# Patient Record
Sex: MALE | Race: BLACK OR AFRICAN AMERICAN | Employment: PART TIME | URBAN - METROPOLITAN AREA
[De-identification: names, ages, dates, MRNs, and addresses within clinical notes are randomized per-mention and may not be internally consistent; named-entity substitution may affect disease eponyms.]

---

## 2017-05-26 ENCOUNTER — APPOINTMENT (OUTPATIENT)
Dept: PHYSICAL THERAPY | Age: 22
End: 2017-05-26

## 2017-12-20 ENCOUNTER — HOSPITAL ENCOUNTER (EMERGENCY)
Age: 22
Discharge: HOME | End: 2017-12-21
Attending: EMERGENCY MEDICINE | Admitting: EMERGENCY MEDICINE
Payer: MEDICARE

## 2017-12-20 VITALS
SYSTOLIC BLOOD PRESSURE: 142 MMHG | DIASTOLIC BLOOD PRESSURE: 79 MMHG | BODY MASS INDEX: 29.02 KG/M2 | HEIGHT: 64 IN | WEIGHT: 170 LBS | HEART RATE: 80 BPM | OXYGEN SATURATION: 97 % | RESPIRATION RATE: 14 BRPM | TEMPERATURE: 97 F

## 2017-12-20 DIAGNOSIS — M54.2 CERVICALGIA: Primary | ICD-10-CM

## 2017-12-20 PROCEDURE — 99283 EMERGENCY DEPT VISIT LOW MDM: CPT

## 2017-12-20 RX ORDER — DIAZEPAM 5 MG/1
5 TABLET ORAL
Status: COMPLETED | OUTPATIENT
Start: 2017-12-20 | End: 2017-12-21

## 2017-12-20 RX ORDER — ACETAMINOPHEN 500 MG
1000 TABLET ORAL
Status: COMPLETED | OUTPATIENT
Start: 2017-12-20 | End: 2017-12-21

## 2017-12-21 ENCOUNTER — APPOINTMENT (OUTPATIENT)
Dept: GENERAL RADIOLOGY | Age: 22
End: 2017-12-21
Attending: PHYSICIAN ASSISTANT
Payer: MEDICARE

## 2017-12-21 PROCEDURE — 72040 X-RAY EXAM NECK SPINE 2-3 VW: CPT

## 2017-12-21 PROCEDURE — 74011250637 HC RX REV CODE- 250/637: Performed by: PHYSICIAN ASSISTANT

## 2017-12-21 RX ORDER — CYCLOBENZAPRINE HCL 10 MG
10 TABLET ORAL
Qty: 12 TAB | Refills: 0 | OUTPATIENT
Start: 2017-12-21 | End: 2020-10-10

## 2017-12-21 RX ADMIN — ACETAMINOPHEN 1000 MG: 500 TABLET ORAL at 00:06

## 2017-12-21 RX ADMIN — DIAZEPAM 5 MG: 5 TABLET ORAL at 00:06

## 2017-12-21 NOTE — ED TRIAGE NOTES
Pt states he has been having left-sided neck pain X 1 month, pt denies any trauma. Pt states he could be sleeping wrong causing his neck to hurt and the top of his left shoulder. Pt A & O X 3, follows commands, no distress noted at this time.
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
## 2017-12-21 NOTE — ED NOTES
I have reviewed discharge instructions with the patient. The patient verbalized understanding. Discharge medications reviewed with patient and appropriate educational materials and side effects teaching were provided. Pt is AxOx4, NAD. Pt ambulated out of ED with steady gait.

## 2020-02-08 ENCOUNTER — APPOINTMENT (OUTPATIENT)
Dept: GENERAL RADIOLOGY | Age: 25
End: 2020-02-08
Attending: PHYSICIAN ASSISTANT
Payer: MEDICARE

## 2020-02-08 ENCOUNTER — HOSPITAL ENCOUNTER (EMERGENCY)
Age: 25
Discharge: HOME | End: 2020-02-08
Attending: EMERGENCY MEDICINE
Payer: MEDICARE

## 2020-02-08 VITALS
OXYGEN SATURATION: 98 % | HEART RATE: 66 BPM | WEIGHT: 186 LBS | DIASTOLIC BLOOD PRESSURE: 81 MMHG | HEIGHT: 65 IN | BODY MASS INDEX: 30.99 KG/M2 | TEMPERATURE: 98.4 F | SYSTOLIC BLOOD PRESSURE: 138 MMHG | RESPIRATION RATE: 18 BRPM

## 2020-02-08 DIAGNOSIS — S21.112A STAB WOUND OF LEFT CHEST, INITIAL ENCOUNTER: Primary | ICD-10-CM

## 2020-02-08 PROCEDURE — 99281 EMR DPT VST MAYX REQ PHY/QHP: CPT

## 2020-02-08 PROCEDURE — 71046 X-RAY EXAM CHEST 2 VIEWS: CPT

## 2020-02-08 PROCEDURE — 75810000293 HC SIMP/SUPERF WND  RPR

## 2020-02-08 RX ORDER — ACETAMINOPHEN 325 MG/1
1000 TABLET ORAL
Qty: 120 TAB | Refills: 0 | OUTPATIENT
Start: 2020-02-08 | End: 2020-10-10
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
## 2020-02-09 NOTE — DISCHARGE INSTRUCTIONS
Patient Education        Cuts Closed With Adhesives: Care Instructions  Your Care Instructions  A cut can happen anywhere on your body. The doctor used an adhesive to close the cut. When the adhesive dries, it forms a film that holds the edges of the cut together. Skin adhesives are sometimes called liquid stitches. If the cut went deep and through the skin, the doctor may have put in a layer of stitches below the adhesive. The deeper layer of stitches brings the deep part of the cut together. These stitches will dissolve and don't need to be removed. You don't see the stitches, only the adhesive. You may have a bandage. The doctor has checked you carefully, but problems can develop later. If you notice any problems or new symptoms, get medical treatment right away. Follow-up care is a key part of your treatment and safety. Be sure to make and go to all appointments, and call your doctor if you are having problems. It's also a good idea to know your test results and keep a list of the medicines you take. How can you care for yourself at home? · Keep the cut dry for the first 24 to 48 hours. After this, you can shower if your doctor okays it. Pat the cut dry. · Don't soak the cut, such as in a bathtub. Your doctor will tell you when it's safe to get the cut wet. · If your doctor told you how to care for your cut, follow your doctor's instructions. If you did not get instructions, follow this general advice:  ? Do not put any kind of ointment, cream, or lotion over the area. This can make the adhesive fall off too soon. ? After the first 24 to 48 hours, wash around the cut with clean water 2 times a day. Do not use hydrogen peroxide or alcohol, which can slow healing. ? If the doctor told you to use a bandage, put on a new bandage after cleaning the cut or if the bandage gets wet or dirty. · Prop up the sore area on a pillow anytime you sit or lie down during the next 3 days.  Try to keep it above the level of your heart. This will help reduce swelling. · Leave the skin adhesive on your skin until it falls off on its own. This may take 5 to 10 days. · Do not scratch, rub, or pick at the adhesive. · Do not put the sticky part of a bandage directly on the adhesive. · Avoid any activity that could cause your cut to reopen. · Be safe with medicines. Read and follow all instructions on the label. ? If the doctor gave you a prescription medicine for pain, take it as prescribed. ? If you are not taking a prescription pain medicine, ask your doctor if you can take an over-the-counter medicine. When should you call for help? Call your doctor now or seek immediate medical care if:    · You have new pain, or your pain gets worse.     · The skin near the cut is cold or pale or changes color.     · You have tingling, weakness, or numbness near the cut.     · The cut starts to bleed.     · You have trouble moving the area near the cut.     · You have symptoms of infection, such as:  ? Increased pain, swelling, warmth, or redness around the cut.  ? Red streaks leading from the cut.  ? Pus draining from the cut.  ? A fever.    Watch closely for changes in your health, and be sure to contact your doctor if:    · The cut reopens.     · You do not get better as expected. Where can you learn more? Go to http://vinayak-thomas.info/. Enter P174 in the search box to learn more about \"Cuts Closed With Adhesives: Care Instructions. \"  Current as of: June 26, 2019  Content Version: 12.2  © 0041-7117 [employer]. Care instructions adapted under license by [employer] (which disclaims liability or warranty for this information). If you have questions about a medical condition or this instruction, always ask your healthcare professional. [address] any warranty or liability for your use of this information.

## 2020-10-10 ENCOUNTER — HOSPITAL ENCOUNTER (EMERGENCY)
Age: 25
Discharge: HOME | End: 2020-10-10
Attending: EMERGENCY MEDICINE
Payer: MEDICARE

## 2020-10-10 VITALS
WEIGHT: 193 LBS | TEMPERATURE: 98 F | OXYGEN SATURATION: 99 % | SYSTOLIC BLOOD PRESSURE: 124 MMHG | HEART RATE: 76 BPM | BODY MASS INDEX: 32.95 KG/M2 | HEIGHT: 64 IN | RESPIRATION RATE: 16 BRPM | DIASTOLIC BLOOD PRESSURE: 77 MMHG

## 2020-10-10 DIAGNOSIS — S16.1XXA STRAIN OF NECK MUSCLE, INITIAL ENCOUNTER: Primary | ICD-10-CM

## 2020-10-10 PROCEDURE — 99281 EMR DPT VST MAYX REQ PHY/QHP: CPT

## 2020-10-10 RX ORDER — ACETAMINOPHEN 325 MG/1
650 TABLET ORAL
Qty: 20 TAB | Refills: 0 | Status: SHIPPED | OUTPATIENT
Start: 2020-10-10

## 2020-10-10 RX ORDER — CYCLOBENZAPRINE HCL 10 MG
10 TABLET ORAL 3 TIMES DAILY
Qty: 9 TAB | Refills: 0 | Status: SHIPPED | OUTPATIENT
Start: 2020-10-10 | End: 2020-10-13
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
## 2024-03-17 ENCOUNTER — HOSPITAL ENCOUNTER (EMERGENCY)
Facility: HOSPITAL | Age: 29
Discharge: HOME | End: 2024-03-17
Attending: EMERGENCY MEDICINE
Payer: COMMERCIAL

## 2024-03-17 VITALS
HEIGHT: 62 IN | WEIGHT: 198 LBS | TEMPERATURE: 99 F | BODY MASS INDEX: 36.44 KG/M2 | SYSTOLIC BLOOD PRESSURE: 174 MMHG | DIASTOLIC BLOOD PRESSURE: 71 MMHG | RESPIRATION RATE: 20 BRPM | OXYGEN SATURATION: 97 % | HEART RATE: 99 BPM

## 2024-03-17 DIAGNOSIS — M54.42 ACUTE LEFT-SIDED LOW BACK PAIN WITH BILATERAL SCIATICA: Primary | ICD-10-CM

## 2024-03-17 DIAGNOSIS — K59.09 OTHER CONSTIPATION: ICD-10-CM

## 2024-03-17 DIAGNOSIS — G43.009 MIGRAINE WITHOUT AURA AND WITHOUT STATUS MIGRAINOSUS, NOT INTRACTABLE: ICD-10-CM

## 2024-03-17 DIAGNOSIS — M54.41 ACUTE LEFT-SIDED LOW BACK PAIN WITH BILATERAL SCIATICA: Primary | ICD-10-CM

## 2024-03-17 LAB
APPEARANCE UR: CLEAR
BILIRUB UR QL: NEGATIVE
COLOR UR: YELLOW
GLUCOSE UR STRIP.AUTO-MCNC: NEGATIVE MG/DL
HGB UR QL STRIP: NEGATIVE
KETONES UR QL STRIP.AUTO: NEGATIVE MG/DL
LEUKOCYTE ESTERASE UR QL STRIP.AUTO: NEGATIVE
NITRITE UR QL STRIP.AUTO: NEGATIVE
PH UR STRIP: 6 (ref 5–8)
PROT UR STRIP-MCNC: NEGATIVE MG/DL
SP GR UR REFRACTOMETRY: 1.01 (ref 1–1.03)
UROBILINOGEN UR QL STRIP.AUTO: 1 EU/DL (ref 0.2–1)

## 2024-03-17 PROCEDURE — 81003 URINALYSIS AUTO W/O SCOPE: CPT

## 2024-03-17 PROCEDURE — 96372 THER/PROPH/DIAG INJ SC/IM: CPT

## 2024-03-17 PROCEDURE — 6360000002 HC RX W HCPCS: Performed by: EMERGENCY MEDICINE

## 2024-03-17 PROCEDURE — 99284 EMERGENCY DEPT VISIT MOD MDM: CPT

## 2024-03-17 PROCEDURE — 6370000000 HC RX 637 (ALT 250 FOR IP): Performed by: EMERGENCY MEDICINE

## 2024-03-17 RX ORDER — LIDOCAINE 4 G/G
1 PATCH TOPICAL
Status: DISCONTINUED | OUTPATIENT
Start: 2024-03-17 | End: 2024-03-17 | Stop reason: HOSPADM

## 2024-03-17 RX ORDER — DOCUSATE SODIUM 100 MG/1
100 CAPSULE, LIQUID FILLED ORAL ONCE
Status: COMPLETED | OUTPATIENT
Start: 2024-03-17 | End: 2024-03-17

## 2024-03-17 RX ORDER — METHYLPREDNISOLONE 4 MG/1
TABLET ORAL
Qty: 21 TABLET | Refills: 0 | Status: SHIPPED | OUTPATIENT
Start: 2024-03-17 | End: 2024-03-23

## 2024-03-17 RX ORDER — MORPHINE SULFATE 15 MG/1
7.5 TABLET ORAL EVERY 6 HOURS PRN
Qty: 10 TABLET | Refills: 0 | Status: SHIPPED | OUTPATIENT
Start: 2024-03-17 | End: 2024-03-22

## 2024-03-17 RX ORDER — MORPHINE SULFATE 10 MG/ML
8 INJECTION, SOLUTION INTRAMUSCULAR; INTRAVENOUS
Status: COMPLETED | OUTPATIENT
Start: 2024-03-17 | End: 2024-03-17

## 2024-03-17 RX ORDER — DOCUSATE SODIUM 100 MG/1
100 CAPSULE, LIQUID FILLED ORAL 2 TIMES DAILY PRN
Qty: 30 CAPSULE | Refills: 0 | Status: SHIPPED | OUTPATIENT
Start: 2024-03-17 | End: 2024-04-01

## 2024-03-17 RX ORDER — BUTALBITAL, ACETAMINOPHEN AND CAFFEINE 300; 40; 50 MG/1; MG/1; MG/1
1 CAPSULE ORAL EVERY 4 HOURS PRN
Qty: 20 CAPSULE | Refills: 0 | Status: SHIPPED | OUTPATIENT
Start: 2024-03-17

## 2024-03-17 RX ORDER — BUTALBITAL, ACETAMINOPHEN AND CAFFEINE 300; 40; 50 MG/1; MG/1; MG/1
1 CAPSULE ORAL
Status: COMPLETED | OUTPATIENT
Start: 2024-03-17 | End: 2024-03-17

## 2024-03-17 RX ORDER — POLYETHYLENE GLYCOL 3350 17 G/17G
17 POWDER, FOR SOLUTION ORAL DAILY PRN
Qty: 30 PACKET | Refills: 0 | Status: SHIPPED | OUTPATIENT
Start: 2024-03-17 | End: 2024-04-16

## 2024-03-17 RX ORDER — LIDOCAINE 4 G/G
1 PATCH TOPICAL DAILY PRN
Qty: 15 EACH | Refills: 1 | Status: SHIPPED | OUTPATIENT
Start: 2024-03-17

## 2024-03-17 RX ADMIN — BUTALBITA,ACETAMINOPHEN AND CAFFEINE 1 CAPSULE: 50; 300; 40 CAPSULE ORAL at 06:44

## 2024-03-17 RX ADMIN — DOCUSATE SODIUM 100 MG: 100 CAPSULE, LIQUID FILLED ORAL at 06:34

## 2024-03-17 RX ADMIN — MORPHINE SULFATE 8 MG: 10 INJECTION INTRAVENOUS at 06:34

## 2024-03-17 ASSESSMENT — LIFESTYLE VARIABLES
HOW OFTEN DO YOU HAVE A DRINK CONTAINING ALCOHOL: PATIENT DECLINED
HOW MANY STANDARD DRINKS CONTAINING ALCOHOL DO YOU HAVE ON A TYPICAL DAY: PATIENT DECLINED

## 2024-03-17 NOTE — PROGRESS NOTES
Received patient in triage with c/o lower back pain, constipation and headache that all started yesterday around 2pm. Pt denies any injury or fall. Pt stated, \" I tried to take a Perc 5 and it didn't even help.\" Pt reports last BM x 2 days ago.

## 2024-03-17 NOTE — DISCHARGE INSTRUCTIONS
Alternate ice and heat, try stretching exercises, gentle massage, try the Salonpas patches, extra strength Tylenol initially for pain relief 500 mg to 1 g.  I have prescribed morphine tablets only for severe breakthrough pain.  I have also prescribed a stool softener, laxative for your constipation and a medication for your migraine headaches.

## 2024-03-17 NOTE — ED PROVIDER NOTES
EMERGENCY DEPARTMENT HISTORY AND PHYSICAL EXAM      Date: 3/17/2024  Patient Name: Scottie Vaz      History of Presenting Illness     Chief Complaint   Patient presents with    Headache    Back Pain    Constipation       Location/Duration/Severity/Modifying factors   Chief Complaint   Patient presents with    Headache    Back Pain    Constipation       HPI:  Scottie Vaz is a 28 y.o. male with PMH significant for asthma presents with 1 day of left-sided low back pain that started after stretching.  Also complains of constipation for the last few days, generalized throbbing headache with some light sensitivity.  Pt  denies numbness or tingling of the legs, urinary incontinence, stool incontinence, ground-level fall or other blunt trauma to the area.  Has been laying on the floor of his female 's house recently.  Treatments attempted at home include Percocet 5-3 25 given by female  with minimal relief.    PCP: Avni Wanye MD    Current Facility-Administered Medications   Medication Dose Route Frequency Provider Last Rate Last Admin    lidocaine 4 % external patch 1 patch  1 patch TransDERmal NOW Bakari Diaz, DO   1 patch at 03/17/24 0634     Current Outpatient Medications   Medication Sig Dispense Refill    methylPREDNISolone (MEDROL, CARA,) 4 MG tablet Take by mouth. 21 tablet 0    morphine (MSIR) 15 MG tablet Take 0.5 tablets by mouth every 6 hours as needed for Pain (1/2 to 1 tablet) for up to 5 days. Max Daily Amount: 30 mg 10 tablet 0    lidocaine (SALONPAS PAIN RELIEVING) 4 % external patch Place 1 patch onto the skin daily as needed (low back pain) 15 each 1    docusate sodium (COLACE) 100 MG capsule Take 1 capsule by mouth 2 times daily as needed for Constipation 30 capsule 0    polyethylene glycol (MIRALAX) 17 g packet Take 1 packet by mouth daily as needed for Constipation 30 packet 0    butalbital-APAP-caffeine -40 MG CAPS per capsule Take 1 capsule by mouth every 4